# Patient Record
(demographics unavailable — no encounter records)

---

## 2025-01-15 NOTE — PAST MEDICAL HISTORY
[Menstruating] : The patient is menstruating [Menarche Age ____] : age at menarche was [unfilled] [Age At Live Birth ___] : Age at live birth: [unfilled]

## 2025-01-21 NOTE — DATA REVIEWED
[FreeTextEntry1] : 11/12/2024 Left Dx Mammo --> BIRADS 2 -There are scattered areas of fibroglandular density. postsurgical changes with removal of previously noted implantable loop recorder in the inner posterior left breast.  Recommendation: Recommend clinical correlation/management for patient reported yellow left nipple discharge. If develops suspicious clear or bloody unilateral nipple discharge, recommend surgical consultation for definitive management. In absence of suspicious clinical findings, routine annua left screening mammogram is recommended.    Patient is due for six-month follow-up right diagnostic mammograph in January 2025 following reportedly benign surgical excisional biopsy. Continue surgical consultation/management.

## 2025-01-21 NOTE — ASSESSMENT
[FreeTextEntry1] : Patient is a 44F with right breast symptomatic fibroadenoma with calcifications s/p excision on 7/17/24: hyalinized FA.  She underwent bilateral scg mmg and subsequent bilateral mmg/us in 3/2024 which showed 2 groups of calcifications biopsied as FA and benign tissue with cysts, with recommendation for mmg in 6 months.  She underwent an MRI in 4/2024 which showed left breast dermal mass (BIRADS 2).  She is now s/p excision of the symptomatic fibroadenoma on 7/17/24: hyalinized FA.  She had left mmg in 11/2024 which was BIRADS 2.  We discussed her most recent imaging in detail. We discussed it was benign.  We discussed breast density. Increasing breast density has been found to increase ones risk of breast cancer, but at this time, there is no clear indication for additional imaging in this setting, as both US and MRI have not been found to improve survival. One can consider bilateral screening US. However, out of 1000 women screened, the use of routine US will only identify an additional 3-5 cancers. The use of US was found to increase the likelihood of undergoing more imaging and more biopsies. She does not have dense breasts.   We discussed obtaining right breast imaging now. She wishes to proceed with her bilateral screening in 3/2025. she is also aware to f/u sooner if she notes any changes to her nipple discharge.  All questions and concerns were answered in detail.  Patient is for bilateral mmg in 3/2025.  She is for follow up after imaging, pending any interval changes.  Total time spent on encounter was greater than 30 minutes , which included face to face time with the patient, performing an exam, reviewing previous medical records, reviewing current imaging/ pathology, documenting in patient record and coordinating care/imaging. Greater than 50% of the encounter was spent on counseling and coordination of her breast issue.

## 2025-01-21 NOTE — HISTORY OF PRESENT ILLNESS
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 Nita Isaac.  FHx: Maternal Grandmother - breast cancer.   TC LR = 9.8%  Her work-up is as follows: 2/27/24: B/L Screening Mammo --. BIRADS 0 IMPRESSION: Right breast calcifications and asymmetries require additional evaluation. Right diagnostic mammogram is recommended with ultrasound. Questioned left breast asymmetry on mammogram 06/03/2021 may be obscured by loop recorder.  Left diagnostic mammogram is recommended with ultrasound. BIRADS-0: Incomplete - Need Additional Imaging Evaluation.   3/26/24: B/L Dx Mammo & Sono --> BIRADS 4 IMPRESSION: Two groupings of linear microcalcifications in the right breast. Recommend stereotactic core biopsy. No suspicious mammographic or sonographic findings in the left breast. Consider supplementary screening with breast MRI with contrast given family history of breast cancer. BIRADS-4: Suspicious; Biopsy Should be Considered.   4/15/2024: Stereo Guided Core Bx  Right, anterior Ca++: -Fibroadenoma associated with microcalcifications. Right, posterior Ca++: -Benign breast tissue showing stromal fibrosis, ductal hyperplasia, and cysts.  4/23/24: B/L Breast MRI --> BIRADS 2 -The breasts are composed of scattered fibroglandular tissue. RIGHT BREAST: -There are two susceptibility artifacts with associated mild smooth rim-enhancing seromas in the upper central to slightly inner breast measuring 1.6 x 1.3 cm (66512:90) and in the upper central posterior breast measuring 1.7 x 1.2 cm (58474:79-80).  These presumably corresponds to sites of recent stereotactic biopsies.  -There are inspissated retroareolar ductal secretions.  -There are scattered enhancing nonspecific foci.  -There are postreduction changes.  -There is no suspicious enhancement in the right breast. LEFT BREAST: -There is a focal skin thickening with associated 1 cm T2 hyperintense dermal enhancing mass in the lower inner breast (30272:26-27).  -Large susceptibility artifact from overlying loop recorder in the medial left breast obscures and limits evaluation of that portion of the breast.  -There are scattered enhancing nonspecific foci.  -There are postreduction changes.  -There is no suspicious enhancement in the left breast. AXILLA/OTHER:  -There is no significant axillary or internal mammary lymphadenopathy. BI-RADS 2 - Benign Finding(s) Addendum for Comparison: IMPRESSION: 1. Postbiopsy cavity with clip artifacts and small seromas right upper central to slightly inner and right upper central posterior breast without any suspicious MRI enhancement, corresponds to recent two sites right breast stereotactic biopsies. NOTE as per outside stereotactic procedure report addendum dated 4/15/2024, a 6 month follow-up right diagnostic mammogram was recommended. 2. 1 cm enhancing dermal lesion far lower inner posterior left breast. Further management on clinical grounds/dermatological consultation is suggested. 3. No suspicious enhancing breast MRI findings. RECOMMENDATION: a. Six-month follow-up right diagnostic mammogram (as per outside report dated 4/15/2024) b. Clinical follow-up of the dermal lesion in the left breast.  7/17/2024 Breast, right mass, needle localized excision: - Hyalinized fibroadenoma containing coarse stromal calcifications located adjacent to prior biopsy site changes.  11/12/2024 Left Dx Mammo --> BIRADS 2 -There are scattered areas of fibroglandular density. postsurgical changes with removal of previously noted implantable loop recorder in the inner posterior left breast.  Recommendation: Recommend clinical correlation/management for patient reported yellow left nipple discharge. If develops suspicious clear or bloody unilateral nipple discharge, recommend surgical consultation for definitive management. In absence of suspicious clinical findings, routine annua left screening mammogram is recommended.    Patient is due for six-month follow-up right diagnostic mammograph in January 2025 following reportedly benign surgical excisional biopsy. Continue surgical consultation/management.   Denies any current complaints. No acute changes to her breasts. She notes chronic left serous discharge. Minimal, non spontaneous.

## 2025-01-21 NOTE — HISTORY OF PRESENT ILLNESS
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 Nita Isaac.  FHx: Maternal Grandmother - breast cancer.   TC LR = 9.8%  Her work-up is as follows: 2/27/24: B/L Screening Mammo --. BIRADS 0 IMPRESSION: Right breast calcifications and asymmetries require additional evaluation. Right diagnostic mammogram is recommended with ultrasound. Questioned left breast asymmetry on mammogram 06/03/2021 may be obscured by loop recorder.  Left diagnostic mammogram is recommended with ultrasound. BIRADS-0: Incomplete - Need Additional Imaging Evaluation.   3/26/24: B/L Dx Mammo & Sono --> BIRADS 4 IMPRESSION: Two groupings of linear microcalcifications in the right breast. Recommend stereotactic core biopsy. No suspicious mammographic or sonographic findings in the left breast. Consider supplementary screening with breast MRI with contrast given family history of breast cancer. BIRADS-4: Suspicious; Biopsy Should be Considered.   4/15/2024: Stereo Guided Core Bx  Right, anterior Ca++: -Fibroadenoma associated with microcalcifications. Right, posterior Ca++: -Benign breast tissue showing stromal fibrosis, ductal hyperplasia, and cysts.  4/23/24: B/L Breast MRI --> BIRADS 2 -The breasts are composed of scattered fibroglandular tissue. RIGHT BREAST: -There are two susceptibility artifacts with associated mild smooth rim-enhancing seromas in the upper central to slightly inner breast measuring 1.6 x 1.3 cm (71251:90) and in the upper central posterior breast measuring 1.7 x 1.2 cm (68483:79-80).  These presumably corresponds to sites of recent stereotactic biopsies.  -There are inspissated retroareolar ductal secretions.  -There are scattered enhancing nonspecific foci.  -There are postreduction changes.  -There is no suspicious enhancement in the right breast. LEFT BREAST: -There is a focal skin thickening with associated 1 cm T2 hyperintense dermal enhancing mass in the lower inner breast (26414:26-27).  -Large susceptibility artifact from overlying loop recorder in the medial left breast obscures and limits evaluation of that portion of the breast.  -There are scattered enhancing nonspecific foci.  -There are postreduction changes.  -There is no suspicious enhancement in the left breast. AXILLA/OTHER:  -There is no significant axillary or internal mammary lymphadenopathy. BI-RADS 2 - Benign Finding(s) Addendum for Comparison: IMPRESSION: 1. Postbiopsy cavity with clip artifacts and small seromas right upper central to slightly inner and right upper central posterior breast without any suspicious MRI enhancement, corresponds to recent two sites right breast stereotactic biopsies. NOTE as per outside stereotactic procedure report addendum dated 4/15/2024, a 6 month follow-up right diagnostic mammogram was recommended. 2. 1 cm enhancing dermal lesion far lower inner posterior left breast. Further management on clinical grounds/dermatological consultation is suggested. 3. No suspicious enhancing breast MRI findings. RECOMMENDATION: a. Six-month follow-up right diagnostic mammogram (as per outside report dated 4/15/2024) b. Clinical follow-up of the dermal lesion in the left breast.  7/17/2024 Breast, right mass, needle localized excision: - Hyalinized fibroadenoma containing coarse stromal calcifications located adjacent to prior biopsy site changes.  11/12/2024 Left Dx Mammo --> BIRADS 2 -There are scattered areas of fibroglandular density. postsurgical changes with removal of previously noted implantable loop recorder in the inner posterior left breast.  Recommendation: Recommend clinical correlation/management for patient reported yellow left nipple discharge. If develops suspicious clear or bloody unilateral nipple discharge, recommend surgical consultation for definitive management. In absence of suspicious clinical findings, routine annua left screening mammogram is recommended.    Patient is due for six-month follow-up right diagnostic mammograph in January 2025 following reportedly benign surgical excisional biopsy. Continue surgical consultation/management.   Denies any current complaints. No acute changes to her breasts. She notes chronic left serous discharge. Minimal, non spontaneous.

## 2025-01-21 NOTE — PHYSICAL EXAM
[Normocephalic] : normocephalic [EOMI] : extra ocular movement intact [No Supraclavicular Adenopathy] : no supraclavicular adenopathy [No Cervical Adenopathy] : no cervical adenopathy [Examined in the supine and seated position] : examined in the supine and seated position [No dominant masses] : no dominant masses in right breast  [No dominant masses] : no dominant masses left breast [No Nipple Retraction] : no left nipple retraction [No Nipple Discharge] : no left nipple discharge [No Axillary Lymphadenopathy] : no left axillary lymphadenopathy [No Rashes] : no rashes [No Ulceration] : no ulceration [de-identified] : NL respirations

## 2025-01-21 NOTE — PHYSICAL EXAM
[Normocephalic] : normocephalic [EOMI] : extra ocular movement intact [No Supraclavicular Adenopathy] : no supraclavicular adenopathy [No Cervical Adenopathy] : no cervical adenopathy [Examined in the supine and seated position] : examined in the supine and seated position [No dominant masses] : no dominant masses in right breast  [No dominant masses] : no dominant masses left breast [No Nipple Retraction] : no left nipple retraction [No Nipple Discharge] : no left nipple discharge [No Axillary Lymphadenopathy] : no left axillary lymphadenopathy [No Rashes] : no rashes [No Ulceration] : no ulceration [de-identified] : NL respirations

## 2025-01-21 NOTE — DATA REVIEWED
[FreeTextEntry1] : 11/12/2024 Left Dx Mammo --> BIRADS 2 -There are scattered areas of fibroglandular density. postsurgical changes with removal of previously noted implantable loop recorder in the inner posterior left breast.  Recommendation: Recommend clinical correlation/management for patient reported yellow left nipple discharge. If develops suspicious clear or bloody unilateral nipple discharge, recommend surgical consultation for definitive management. In absence of suspicious clinical findings, routine annua left screening mammogram is recommended.    Patient is due for six-month follow-up right diagnostic mammograph in January 2025 following reportedly benign surgical excisional biopsy. Continue surgical consultation/management.  [employer] Intermediate Repair Preamble Text (Leave Blank If You Do Not Want): Undermining was performed with blunt dissection.

## 2025-01-29 NOTE — REVIEW OF SYSTEMS
[Depression] : depression [Decreased Libido] : decreased libido [Negative] : Heme/Lymph [FreeTextEntry8] : see HPI [de-identified] : see HPI

## 2025-01-29 NOTE — HISTORY OF PRESENT ILLNESS
[Patient reported mammogram was normal] : Patient reported mammogram was normal [Patient reported breast sonogram was normal] : Patient reported breast sonogram was normal [Patient reported PAP Smear was normal] : Patient reported PAP Smear was normal [N] : Patient does not use contraception [Y] : Positive pregnancy history [Mammogramdate] : 2024 [BreastSonogramDate] : 3/2024 [PapSmeardate] : 2020 [MensesFreq] : 28 [MensesLength] : 3-6 [MensesAmount] : heavy flow  [PGHxTotal] : 3 [[facility] : 2 [PGHxAbortions] : 1 [PGHxABSpont] : 1 [[facility]] : 2 [Normal Amount/Duration] :  normal amount and duration [Regular Cycle Intervals] : periods have been regular [Frequency: Q ___ days] : menstrual periods occur approximately every [unfilled] days [Menarche Age: ____] : age at menarche was [unfilled] [Currently Active] : currently active [Men] : men [No] : No [TextBox_4] : 44yo  with LMP 1/15/25 currently menstruating and declining exam and evaluation but wants to discuss her irregular heavy bleeding and perimenopause sx.  She c/o decreased libido, anxiety and depressed mood.  She is concerned about menopause and mgmt options.  Her depression evaluation was positive but patient denies SI/HI and states she does not want to take any medication,  [FreeTextEntry1] : 1/15/25

## 2025-01-29 NOTE — REVIEW OF SYSTEMS
[Depression] : depression [Decreased Libido] : decreased libido [Negative] : Heme/Lymph [FreeTextEntry8] : see HPI [de-identified] : see HPI

## 2025-01-29 NOTE — COUNSELING
[Nutrition/ Exercise/ Weight Management] : nutrition, exercise, weight management [Vitamins/Supplements] : vitamins/supplements [Bladder Hygiene] : bladder hygiene [Contraception/ Emergency Contraception/ Safe Sexual Practices] : contraception, emergency contraception, safe sexual practices [Lab Results] : lab results [Medication Management] : medication management

## 2025-01-29 NOTE — REVIEW OF SYSTEMS
[Depression] : depression [Decreased Libido] : decreased libido [Negative] : Heme/Lymph [FreeTextEntry8] : see HPI [de-identified] : see HPI

## 2025-01-29 NOTE — HISTORY OF PRESENT ILLNESS
[Patient reported mammogram was normal] : Patient reported mammogram was normal [Patient reported breast sonogram was normal] : Patient reported breast sonogram was normal [Patient reported PAP Smear was normal] : Patient reported PAP Smear was normal [N] : Patient does not use contraception [Y] : Positive pregnancy history [Mammogramdate] : 2024 [BreastSonogramDate] : 3/2024 [PapSmeardate] : 2020 [MensesFreq] : 28 [MensesLength] : 3-6 [MensesAmount] : heavy flow  [PGHxTotal] : 3 [[facility]] : 2 [PGHxAbortions] : 1 [[facility]] : 2 [PGHxABSpont] : 1 [Normal Amount/Duration] :  normal amount and duration [Regular Cycle Intervals] : periods have been regular [Frequency: Q ___ days] : menstrual periods occur approximately every [unfilled] days [Menarche Age: ____] : age at menarche was [unfilled] [Currently Active] : currently active [Men] : men [No] : No [TextBox_4] : 44yo  with LMP 1/15/25 currently menstruating and declining exam and evaluation but wants to discuss her irregular heavy bleeding and perimenopause sx.  She c/o decreased libido, anxiety and depressed mood.  She is concerned about menopause and mgmt options.  Her depression evaluation was positive but patient denies SI/HI and states she does not want to take any medication,  [FreeTextEntry1] : 1/15/25

## 2025-01-29 NOTE — PHYSICAL EXAM
[No Lymphadenopathy] : no lymphadenopathy [Soft] : soft [Non-tender] : non-tender [Non-distended] : non-distended [No Mass] : no mass [Examination Of The Breasts] : a normal appearance [No Discharge] : no discharge [Breast Reconstruction Right] : breast reconstruction [Breast Reconstruction Left] : breast reconstruction [No Masses] : no breast masses were palpable [No Lesions] : no lesions  [Labia Majora] : normal [Labia Minora] : normal [Normal] : normal [No Bleeding] : There was no active vaginal bleeding [Tenderness] : nontender [Enlarged ___ wks] : not enlarged [Mass ___ cm] : no uterine mass was palpated [Uterine Adnexae] : normal [FreeTextEntry5] :  Pap smear done. [FreeTextEntry6] : Exam limited to habitus-no pain elicited on bimanual exam, no rebound or guarding   [Chaperone Declined] : Patient declined chaperone [Appropriately responsive] : appropriately responsive [Alert] : alert [No Acute Distress] : no acute distress [Regular Rate Rhythm] : regular rate rhythm [Oriented x3] : oriented x3 [Depressed Mood] : depressed mood

## 2025-01-29 NOTE — HISTORY OF PRESENT ILLNESS
[Patient reported mammogram was normal] : Patient reported mammogram was normal [Patient reported breast sonogram was normal] : Patient reported breast sonogram was normal [Patient reported PAP Smear was normal] : Patient reported PAP Smear was normal [N] : Patient does not use contraception [Y] : Positive pregnancy history [Mammogramdate] : 2024 [BreastSonogramDate] : 3/2024 [PapSmeardate] : 2020 [MensesFreq] : 28 [MensesLength] : 3-6 [MensesAmount] : heavy flow  [PGHxTotal] : 3 [[facility]] : 2 [PGHxAbortions] : 1 [[facility]xLiving] : 2 [PGHxABSpont] : 1 [Normal Amount/Duration] :  normal amount and duration [Regular Cycle Intervals] : periods have been regular [Frequency: Q ___ days] : menstrual periods occur approximately every [unfilled] days [Menarche Age: ____] : age at menarche was [unfilled] [Currently Active] : currently active [Men] : men [No] : No [TextBox_4] : 46yo  with LMP 1/15/25 currently menstruating and declining exam and evaluation but wants to discuss her irregular heavy bleeding and perimenopause sx.  She c/o decreased libido, anxiety and depressed mood.  She is concerned about menopause and mgmt options.  Her depression evaluation was positive but patient denies SI/HI and states she does not want to take any medication,  [FreeTextEntry1] : 1/15/25

## 2025-01-29 NOTE — DISCUSSION/SUMMARY
[FreeTextEntry1] : A: 45-year-old with irregular/heavy menstrual bleeding, adenomyosis, perimenopause symptoms, depressed mood, decreased libido, BMI 40  P: Consultation of patient's symptoms and management counseling done Patient declined physical exam Safe sex practices Pain and bleeding precautions-menstrual diary encouraged Referral for pelvic ultrasound Horrible panel diet Perimenopause symptom management counseling done-patient will think about hormonal and nonhormonal options and follow-up for further management planning after workup completed Patient encouraged to follow-up with therapy or psychiatry for potential medical management which she declined at this time-strict precautions given Encourage healthy diet and exercise and weight loss as tolerated Follow-up after tests and imaging completed or for routine exam or as needed

## 2025-04-22 NOTE — HISTORY OF PRESENT ILLNESS
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 Nita Isaac.  FHx: Maternal Grandmother - breast cancer.   TC LR = 9.8%  Her work-up is as follows: 2/27/24: B/L Screening Mammo --. BIRADS 0 IMPRESSION: Right breast calcifications and asymmetries require additional evaluation. Right diagnostic mammogram is recommended with ultrasound. Questioned left breast asymmetry on mammogram 06/03/2021 may be obscured by loop recorder.  Left diagnostic mammogram is recommended with ultrasound. BIRADS-0: Incomplete - Need Additional Imaging Evaluation.   3/26/24: B/L Dx Mammo & Sono --> BIRADS 4 IMPRESSION: Two groupings of linear microcalcifications in the right breast. Recommend stereotactic core biopsy. No suspicious mammographic or sonographic findings in the left breast. Consider supplementary screening with breast MRI with contrast given family history of breast cancer. BIRADS-4: Suspicious; Biopsy Should be Considered.   4/15/2024: Stereo Guided Core Bx  Right, anterior Ca++: -Fibroadenoma associated with microcalcifications. Right, posterior Ca++: -Benign breast tissue showing stromal fibrosis, ductal hyperplasia, and cysts.  4/23/24: B/L Breast MRI --> BIRADS 2 -The breasts are composed of scattered fibroglandular tissue. RIGHT BREAST: -There are two susceptibility artifacts with associated mild smooth rim-enhancing seromas in the upper central to slightly inner breast measuring 1.6 x 1.3 cm (03109:90) and in the upper central posterior breast measuring 1.7 x 1.2 cm (10939:79-80).  These presumably corresponds to sites of recent stereotactic biopsies.  -There are inspissated retroareolar ductal secretions.  -There are scattered enhancing nonspecific foci.  -There are postreduction changes.  -There is no suspicious enhancement in the right breast. LEFT BREAST: -There is a focal skin thickening with associated 1 cm T2 hyperintense dermal enhancing mass in the lower inner breast (78562:26-27).  -Large susceptibility artifact from overlying loop recorder in the medial left breast obscures and limits evaluation of that portion of the breast.  -There are scattered enhancing nonspecific foci.  -There are postreduction changes.  -There is no suspicious enhancement in the left breast. AXILLA/OTHER:  -There is no significant axillary or internal mammary lymphadenopathy. BI-RADS 2 - Benign Finding(s) Addendum for Comparison: IMPRESSION: 1. Postbiopsy cavity with clip artifacts and small seromas right upper central to slightly inner and right upper central posterior breast without any suspicious MRI enhancement, corresponds to recent two sites right breast stereotactic biopsies. NOTE as per outside stereotactic procedure report addendum dated 4/15/2024, a 6 month follow-up right diagnostic mammogram was recommended. 2. 1 cm enhancing dermal lesion far lower inner posterior left breast. Further management on clinical grounds/dermatological consultation is suggested. 3. No suspicious enhancing breast MRI findings. RECOMMENDATION: a. Six-month follow-up right diagnostic mammogram (as per outside report dated 4/15/2024) b. Clinical follow-up of the dermal lesion in the left breast.  7/17/2024 Breast, right mass, needle localized excision: - Hyalinized fibroadenoma containing coarse stromal calcifications located adjacent to prior biopsy site changes.  11/12/2024 Left Dx Mammo --> BIRADS 2 -There are scattered areas of fibroglandular density. postsurgical changes with removal of previously noted implantable loop recorder in the inner posterior left breast.  Recommendation: Recommend clinical correlation/management for patient reported yellow left nipple discharge. If develops suspicious clear or bloody unilateral nipple discharge, recommend surgical consultation for definitive management. In absence of suspicious clinical findings, routine annua left screening mammogram is recommended.    Patient is due for six-month follow-up right diagnostic mammograph in January 2025 following reportedly benign surgical excisional biopsy. Continue surgical consultation/management.   She notes chronic left serous discharge. Minimal, non spontaneous.   3/26/2025 b/l mammo-->birads0 scattered areas of fibroglandular density.  an area of benign architectural distortion corresponding to the site of surgery seen in the right breast.  a nipple retraction seen in the left breast.  4/15/2025 left dx mammo and US -->birads1 scattered areas of fibroglandular density. No mammographic or sonographic evidence of malignancy.  No acute changes to her breasts.

## 2025-04-22 NOTE — DATA REVIEWED
[FreeTextEntry1] : [id number]     EXAM:   MAMMO SCREEN W EDIE BI#   ORDERED BY: THERESE GRANGER  PROCEDURE DATE:  03/26/2025    INTERPRETATION:  HISTORY: Bilateral MG MAMMO SCREEN W EDIE BI# was performed. Patient is 45 years old and is seen for screening. The patient has no personal history of cancer.  The patient has the following family history of breast cancer:  mother, at age 57, breast cancer.  RISK ASSESSMENT: NCI Lifetime Risk: 14.4 [facility] Lifetime Risk: 12.1  CLINICAL BREAST EXAM: The patient reports their last clinical breast exam was performed within the past year.  COMPARISON STUDIES: No prior imaging studies are available for comparison.  MAMMOGRAM FINDINGS: Mammography was performed including the following views: bilateral craniocaudal with tomosynthesis, bilateral mediolateral oblique with tomosynthesis.  The examination includes digital synthetic 2D and digital tomosynthesis 3D images. Additional imaging analysis was performed using CAD (computer-aided detection) software.  There are scattered areas of fibroglandular density.  Finding 1:  There is an area of benign architectural distortion corresponding to the site of surgery seen in the right breast.  Finding 2:  There is a nipple retraction seen in the left breast.  IMPRESSION: Finding 1:  Area of benign architectural distortion corresponding to the site of surgery and consistent with post operative fibrosis in the right breast is benign finding.  Finding 2:  Nipple retraction in the left breast requires additional evaluation.  RECOMMENDATION: Patient will be recalled for additional mammographic views and breast ultrasound.  ASSESSMENT: BI-RADS Category 0:  Incomplete: Needs Additional Imaging Evaluation 7518     EXAM:   US BREAST LIMITED LT   ORDERED BY: THERESE GRANGER  ACC: [id number]     EXAM:   MAMMO DIAG W EDIE LT#   ORDERED BY: THERESE GRANGER  PROCEDURE DATE:  04/15/2025    INTERPRETATION:  Clinical history: Call back for suggestion of left nipple retraction.  The patient reports last clinical breast examination was performed about: Within the past year.  Family history of breast cancer: Mother at age 57.  Comparisons: Mammograms dating back to 2021.  Views obtained: left full field digital 2D and digital tomosynthesis images as well as spot views.  Computer-aided detection was utilized in the interpretation of this examination.  Breast composition: There are scattered areas of fibroglandular density.  Findings:  Mammogram:  No suspicious mass, microcalcifications or areas of architectural distortion seen in the left breast including the retroareolar region.  Ultrasound:  Targeted unilateral left breast ultrasound was performed.  No suspicious finding in the retroareolar region of the left breast.  Impression: No mammographic or sonographic evidence of malignancy.  Recommendation: Unless otherwise indicated by clinical findings, annual screening mammography recommended.  BI-RADS Category 1: Negative

## 2025-04-22 NOTE — HISTORY OF PRESENT ILLNESS
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 Nita Isaac.  FHx: Maternal Grandmother - breast cancer.   TC LR = 9.8%  Her work-up is as follows: 2/27/24: B/L Screening Mammo --. BIRADS 0 IMPRESSION: Right breast calcifications and asymmetries require additional evaluation. Right diagnostic mammogram is recommended with ultrasound. Questioned left breast asymmetry on mammogram 06/03/2021 may be obscured by loop recorder.  Left diagnostic mammogram is recommended with ultrasound. BIRADS-0: Incomplete - Need Additional Imaging Evaluation.   3/26/24: B/L Dx Mammo & Sono --> BIRADS 4 IMPRESSION: Two groupings of linear microcalcifications in the right breast. Recommend stereotactic core biopsy. No suspicious mammographic or sonographic findings in the left breast. Consider supplementary screening with breast MRI with contrast given family history of breast cancer. BIRADS-4: Suspicious; Biopsy Should be Considered.   4/15/2024: Stereo Guided Core Bx  Right, anterior Ca++: -Fibroadenoma associated with microcalcifications. Right, posterior Ca++: -Benign breast tissue showing stromal fibrosis, ductal hyperplasia, and cysts.  4/23/24: B/L Breast MRI --> BIRADS 2 -The breasts are composed of scattered fibroglandular tissue. RIGHT BREAST: -There are two susceptibility artifacts with associated mild smooth rim-enhancing seromas in the upper central to slightly inner breast measuring 1.6 x 1.3 cm (45227:90) and in the upper central posterior breast measuring 1.7 x 1.2 cm (55727:79-80).  These presumably corresponds to sites of recent stereotactic biopsies.  -There are inspissated retroareolar ductal secretions.  -There are scattered enhancing nonspecific foci.  -There are postreduction changes.  -There is no suspicious enhancement in the right breast. LEFT BREAST: -There is a focal skin thickening with associated 1 cm T2 hyperintense dermal enhancing mass in the lower inner breast (03736:26-27).  -Large susceptibility artifact from overlying loop recorder in the medial left breast obscures and limits evaluation of that portion of the breast.  -There are scattered enhancing nonspecific foci.  -There are postreduction changes.  -There is no suspicious enhancement in the left breast. AXILLA/OTHER:  -There is no significant axillary or internal mammary lymphadenopathy. BI-RADS 2 - Benign Finding(s) Addendum for Comparison: IMPRESSION: 1. Postbiopsy cavity with clip artifacts and small seromas right upper central to slightly inner and right upper central posterior breast without any suspicious MRI enhancement, corresponds to recent two sites right breast stereotactic biopsies. NOTE as per outside stereotactic procedure report addendum dated 4/15/2024, a 6 month follow-up right diagnostic mammogram was recommended. 2. 1 cm enhancing dermal lesion far lower inner posterior left breast. Further management on clinical grounds/dermatological consultation is suggested. 3. No suspicious enhancing breast MRI findings. RECOMMENDATION: a. Six-month follow-up right diagnostic mammogram (as per outside report dated 4/15/2024) b. Clinical follow-up of the dermal lesion in the left breast.  7/17/2024 Breast, right mass, needle localized excision: - Hyalinized fibroadenoma containing coarse stromal calcifications located adjacent to prior biopsy site changes.  11/12/2024 Left Dx Mammo --> BIRADS 2 -There are scattered areas of fibroglandular density. postsurgical changes with removal of previously noted implantable loop recorder in the inner posterior left breast.  Recommendation: Recommend clinical correlation/management for patient reported yellow left nipple discharge. If develops suspicious clear or bloody unilateral nipple discharge, recommend surgical consultation for definitive management. In absence of suspicious clinical findings, routine annua left screening mammogram is recommended.    Patient is due for six-month follow-up right diagnostic mammograph in January 2025 following reportedly benign surgical excisional biopsy. Continue surgical consultation/management.   She notes chronic left serous discharge. Minimal, non spontaneous.   3/26/2025 b/l mammo-->birads0 scattered areas of fibroglandular density.  an area of benign architectural distortion corresponding to the site of surgery seen in the right breast.  a nipple retraction seen in the left breast.  4/15/2025 left dx mammo and US -->birads1 scattered areas of fibroglandular density. No mammographic or sonographic evidence of malignancy.  No acute changes to her breasts.

## 2025-04-22 NOTE — PHYSICAL EXAM
[Normocephalic] : normocephalic [EOMI] : extra ocular movement intact [No Supraclavicular Adenopathy] : no supraclavicular adenopathy [No Cervical Adenopathy] : no cervical adenopathy [Examined in the supine and seated position] : examined in the supine and seated position [No dominant masses] : no dominant masses in right breast  [No dominant masses] : no dominant masses left breast [No Nipple Retraction] : no left nipple retraction [No Nipple Discharge] : no left nipple discharge [No Axillary Lymphadenopathy] : no left axillary lymphadenopathy [No Rashes] : no rashes [No Ulceration] : no ulceration [de-identified] : NL respirations

## 2025-04-22 NOTE — PHYSICAL EXAM
[Normocephalic] : normocephalic [EOMI] : extra ocular movement intact [No Supraclavicular Adenopathy] : no supraclavicular adenopathy [No Cervical Adenopathy] : no cervical adenopathy [Examined in the supine and seated position] : examined in the supine and seated position [No dominant masses] : no dominant masses in right breast  [No dominant masses] : no dominant masses left breast [No Nipple Retraction] : no left nipple retraction [No Nipple Discharge] : no left nipple discharge [No Axillary Lymphadenopathy] : no left axillary lymphadenopathy [No Rashes] : no rashes [No Ulceration] : no ulceration [de-identified] : NL respirations

## 2025-04-22 NOTE — ASSESSMENT
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   TC LR = 9.8%  Her work-up is as follows: 2/27/24: B/L Screening Mammo --. BIRADS 0 IMPRESSION: Right breast calcifications and asymmetries require additional evaluation. Right diagnostic mammogram is recommended with ultrasound. Questioned left breast asymmetry on mammogram 06/03/2021 may be obscured by loop recorder.  Left diagnostic mammogram is recommended with ultrasound. BIRADS-0: Incomplete - Need Additional Imaging Evaluation.   3/26/24: B/L Dx Mammo & Sono --> BIRADS 4 IMPRESSION: Two groupings of linear microcalcifications in the right breast. Recommend stereotactic core biopsy. No suspicious mammographic or sonographic findings in the left breast. Consider supplementary screening with breast MRI with contrast given family history of breast cancer. BIRADS-4: Suspicious; Biopsy Should be Considered.   4/15/2024: Stereo Guided Core Bx  Right, anterior Ca++: -Fibroadenoma associated with microcalcifications. Right, posterior Ca++: -Benign breast tissue showing stromal fibrosis, ductal hyperplasia, and cysts.  4/23/24: B/L Breast MRI --> BIRADS 2 -The breasts are composed of scattered fibroglandular tissue. RIGHT BREAST: -There are two susceptibility artifacts with associated mild smooth rim-enhancing seromas in the upper central to slightly inner breast measuring 1.6 x 1.3 cm (50568:90) and in the upper central posterior breast measuring 1.7 x 1.2 cm (43520:79-80).  These presumably corresponds to sites of recent stereotactic biopsies.  -There are inspissated retroareolar ductal secretions.  -There are scattered enhancing nonspecific foci.  -There are postreduction changes.  -There is no suspicious enhancement in the right breast. LEFT BREAST: -There is a focal skin thickening with associated 1 cm T2 hyperintense dermal enhancing mass in the lower inner breast (92364:26-27).  -Large susceptibility artifact from overlying loop recorder in the medial left breast obscures and limits evaluation of that portion of the breast.  -There are scattered enhancing nonspecific foci.  -There are postreduction changes.  -There is no suspicious enhancement in the left breast. AXILLA/OTHER:  -There is no significant axillary or internal mammary lymphadenopathy. BI-RADS 2 - Benign Finding(s) Addendum for Comparison: IMPRESSION: 1. Postbiopsy cavity with clip artifacts and small seromas right upper central to slightly inner and right upper central posterior breast without any suspicious MRI enhancement, corresponds to recent two sites right breast stereotactic biopsies. NOTE as per outside stereotactic procedure report addendum dated 4/15/2024, a 6 month follow-up right diagnostic mammogram was recommended. 2. 1 cm enhancing dermal lesion far lower inner posterior left breast. Further management on clinical grounds/dermatological consultation is suggested. 3. No suspicious enhancing breast MRI findings. RECOMMENDATION: a. Six-month follow-up right diagnostic mammogram (as per outside report dated 4/15/2024) b. Clinical follow-up of the dermal lesion in the left breast.  7/17/2024 Breast, right mass, needle localized excision: - Hyalinized fibroadenoma containing coarse stromal calcifications located adjacent to prior biopsy site changes.  11/12/2024 Left Dx Mammo --> BIRADS 2 -There are scattered areas of fibroglandular density. postsurgical changes with removal of previously noted implantable loop recorder in the inner posterior left breast.  Recommendation: Recommend clinical correlation/management for patient reported yellow left nipple discharge. If develops suspicious clear or bloody unilateral nipple discharge, recommend surgical consultation for definitive management. In absence of suspicious clinical findings, routine annua left screening mammogram is recommended.    Patient is due for six-month follow-up right diagnostic mammograph in January 2025 following reportedly benign surgical excisional biopsy. Continue surgical consultation/management.   She notes chronic left serous discharge. Minimal, non spontaneous.   3/26/2025 b/l mammo-->birads0 scattered areas of fibroglandular density.  an area of benign architectural distortion corresponding to the site of surgery seen in the right breast.  a nipple retraction seen in the left breast.  4/15/2025 left dx mammo and US -->birads1 scattered areas of fibroglandular density. No mammographic or sonographic evidence of malignancy.  We discussed her most recent imaging in detail. We discussed it was benign.  We discussed breast density. Increasing breast density has been found to increase ones risk of breast cancer, but at this time, there is no clear indication for additional imaging in this setting, as both US and MRI have not been found to improve survival. One can consider bilateral screening US. However, out of 1000 women screened, the use of routine US will only identify an additional 3-5 cancers. The use of US was found to increase the likelihood of undergoing more imaging and more biopsies. She does not have dense breasts.   All questions and concerns were answered in detail.  Patient is for bilateral mmg in 3/2026. She is for follow up after imaging, pending any interval changes.  Total time spent on encounter was greater than 30 minutes , which included face to face time with the patient, performing an exam, reviewing previous medical records, reviewing current imaging/ pathology, documenting in patient record and coordinating care/imaging. Greater than 50% of the encounter was spent on counseling and coordination of her breast issue.

## 2025-04-22 NOTE — DATA REVIEWED
[FreeTextEntry1] : [id number]     EXAM:   MAMMO SCREEN W EDIE BI#   ORDERED BY: THERESE GRANGER  PROCEDURE DATE:  03/26/2025    INTERPRETATION:  HISTORY: Bilateral MG MAMMO SCREEN W EDIE BI# was performed. Patient is 45 years old and is seen for screening. The patient has no personal history of cancer.  The patient has the following family history of breast cancer:  mother, at age 57, breast cancer.  RISK ASSESSMENT: NCI Lifetime Risk: 14.4 [facility] Lifetime Risk: 12.1  CLINICAL BREAST EXAM: The patient reports their last clinical breast exam was performed within the past year.  COMPARISON STUDIES: No prior imaging studies are available for comparison.  MAMMOGRAM FINDINGS: Mammography was performed including the following views: bilateral craniocaudal with tomosynthesis, bilateral mediolateral oblique with tomosynthesis.  The examination includes digital synthetic 2D and digital tomosynthesis 3D images. Additional imaging analysis was performed using CAD (computer-aided detection) software.  There are scattered areas of fibroglandular density.  Finding 1:  There is an area of benign architectural distortion corresponding to the site of surgery seen in the right breast.  Finding 2:  There is a nipple retraction seen in the left breast.  IMPRESSION: Finding 1:  Area of benign architectural distortion corresponding to the site of surgery and consistent with post operative fibrosis in the right breast is benign finding.  Finding 2:  Nipple retraction in the left breast requires additional evaluation.  RECOMMENDATION: Patient will be recalled for additional mammographic views and breast ultrasound.  ASSESSMENT: BI-RADS Category 0:  Incomplete: Needs Additional Imaging Evaluation 7518     EXAM:   US BREAST LIMITED LT   ORDERED BY: THERESE GRANGER  ACC: [id number]     EXAM:   MAMMO DIAG W EDIE LT#   ORDERED BY: THERESE GRANGER  PROCEDURE DATE:  04/15/2025    INTERPRETATION:  Clinical history: Call back for suggestion of left nipple retraction.  The patient reports last clinical breast examination was performed about: Within the past year.  Family history of breast cancer: Mother at age 57.  Comparisons: Mammograms dating back to 2021.  Views obtained: left full field digital 2D and digital tomosynthesis images as well as spot views.  Computer-aided detection was utilized in the interpretation of this examination.  Breast composition: There are scattered areas of fibroglandular density.  Findings:  Mammogram:  No suspicious mass, microcalcifications or areas of architectural distortion seen in the left breast including the retroareolar region.  Ultrasound:  Targeted unilateral left breast ultrasound was performed.  No suspicious finding in the retroareolar region of the left breast.  Impression: No mammographic or sonographic evidence of malignancy.  Recommendation: Unless otherwise indicated by clinical findings, annual screening mammography recommended.  BI-RADS Category 1: Negative

## 2025-04-22 NOTE — ASSESSMENT
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   TC LR = 9.8%  Her work-up is as follows: 2/27/24: B/L Screening Mammo --. BIRADS 0 IMPRESSION: Right breast calcifications and asymmetries require additional evaluation. Right diagnostic mammogram is recommended with ultrasound. Questioned left breast asymmetry on mammogram 06/03/2021 may be obscured by loop recorder.  Left diagnostic mammogram is recommended with ultrasound. BIRADS-0: Incomplete - Need Additional Imaging Evaluation.   3/26/24: B/L Dx Mammo & Sono --> BIRADS 4 IMPRESSION: Two groupings of linear microcalcifications in the right breast. Recommend stereotactic core biopsy. No suspicious mammographic or sonographic findings in the left breast. Consider supplementary screening with breast MRI with contrast given family history of breast cancer. BIRADS-4: Suspicious; Biopsy Should be Considered.   4/15/2024: Stereo Guided Core Bx  Right, anterior Ca++: -Fibroadenoma associated with microcalcifications. Right, posterior Ca++: -Benign breast tissue showing stromal fibrosis, ductal hyperplasia, and cysts.  4/23/24: B/L Breast MRI --> BIRADS 2 -The breasts are composed of scattered fibroglandular tissue. RIGHT BREAST: -There are two susceptibility artifacts with associated mild smooth rim-enhancing seromas in the upper central to slightly inner breast measuring 1.6 x 1.3 cm (52976:90) and in the upper central posterior breast measuring 1.7 x 1.2 cm ([id number]:79-80).  These presumably corresponds to sites of recent stereotactic biopsies.  -There are inspissated retroareolar ductal secretions.  -There are scattered enhancing nonspecific foci.  -There are postreduction changes.  -There is no suspicious enhancement in the right breast. LEFT BREAST: -There is a focal skin thickening with associated 1 cm T2 hyperintense dermal enhancing mass in the lower inner breast (75714:26-27).  -Large susceptibility artifact from overlying loop recorder in the medial left breast obscures and limits evaluation of that portion of the breast.  -There are scattered enhancing nonspecific foci.  -There are postreduction changes.  -There is no suspicious enhancement in the left breast. AXILLA/OTHER:  -There is no significant axillary or internal mammary lymphadenopathy. BI-RADS 2 - Benign Finding(s) Addendum for Comparison: IMPRESSION: 1. Postbiopsy cavity with clip artifacts and small seromas right upper central to slightly inner and right upper central posterior breast without any suspicious MRI enhancement, corresponds to recent two sites right breast stereotactic biopsies. NOTE as per outside stereotactic procedure report addendum dated 4/15/2024, a 6 month follow-up right diagnostic mammogram was recommended. 2. 1 cm enhancing dermal lesion far lower inner posterior left breast. Further management on clinical grounds/dermatological consultation is suggested. 3. No suspicious enhancing breast MRI findings. RECOMMENDATION: a. Six-month follow-up right diagnostic mammogram (as per outside report dated 4/15/2024) b. Clinical follow-up of the dermal lesion in the left breast.  7/17/2024 Breast, right mass, needle localized excision: - Hyalinized fibroadenoma containing coarse stromal calcifications located adjacent to prior biopsy site changes.  11/12/2024 Left Dx Mammo --> BIRADS 2 -There are scattered areas of fibroglandular density. postsurgical changes with removal of previously noted implantable loop recorder in the inner posterior left breast.  Recommendation: Recommend clinical correlation/management for patient reported yellow left nipple discharge. If develops suspicious clear or bloody unilateral nipple discharge, recommend surgical consultation for definitive management. In absence of suspicious clinical findings, routine annua left screening mammogram is recommended.    Patient is due for six-month follow-up right diagnostic mammograph in January 2025 following reportedly benign surgical excisional biopsy. Continue surgical consultation/management.   She notes chronic left serous discharge. Minimal, non spontaneous.   3/26/2025 b/l mammo-->birads0 scattered areas of fibroglandular density.  an area of benign architectural distortion corresponding to the site of surgery seen in the right breast.  a nipple retraction seen in the left breast.  4/15/2025 left dx mammo and US -->birads1 scattered areas of fibroglandular density. No mammographic or sonographic evidence of malignancy.  We discussed her most recent imaging in detail. We discussed it was benign.  We discussed breast density. Increasing breast density has been found to increase ones risk of breast cancer, but at this time, there is no clear indication for additional imaging in this setting, as both US and MRI have not been found to improve survival. One can consider bilateral screening US. However, out of 1000 women screened, the use of routine US will only identify an additional 3-5 cancers. The use of US was found to increase the likelihood of undergoing more imaging and more biopsies. She does not have dense breasts.   All questions and concerns were answered in detail.  Patient is for bilateral mmg in 3/2026. She is for follow up after imaging, pending any interval changes.  Total time spent on encounter was greater than 30 minutes , which included face to face time with the patient, performing an exam, reviewing previous medical records, reviewing current imaging/ pathology, documenting in patient record and coordinating care/imaging. Greater than 50% of the encounter was spent on counseling and coordination of her breast issue.

## 2025-04-27 NOTE — DISCUSSION/SUMMARY
[FreeTextEntry1] : A: 45-year-old with irregular menses/dysmenorrhea, AUB, adenomyosis, perimenopause, BMI 40  P: GYN consultation and recent test results reviewed and discussed Safe sex practices Pain and bleeding precautions Menstrual diary encouraged Options of symptom management counseled-patient will think about her options Referral for cardiologist for HRT/medication management given Encourage follow-up with therapy as scheduled and encourage psychiatry evaluation-patient will think about options Encourage healthy diet and activity as tolerated Follow-up for further management planning and for annual GYN exam or as needed

## 2025-04-27 NOTE — HISTORY OF PRESENT ILLNESS
[TextBox_4] : 46y/o  LMP- 4- cam for follow up consultation for evaluation and mgmt of perimenopause symptoms.  Patient states menses continues to be irregular skipping February, very heavy in March and this month 1 day of bleeding and then continued spotting.  She declines exam today due to bleeding.  She is due for pap and annual exam but states she will return.  She has history of adenomyosis noted on previous MRI but most recent pelvic ultrasound was within normal limits and did not mention any evidence of adenomyosis although sonogram is not as reliable.  She also continues to struggle with depressed mood and stress but has started couple therapy and is coping better.  She denies SI/HI but still was counseled on recommendation to follow-up with psychiatry and potential medication management.  We discussed the risk/benefits/alternatives of all options of management due to patient's complaints and symptoms.  We discussed conservative management with pain and bleeding precautions.  We discussed medical management however patient does have a history of A-fib and is on medication and counseled if considering HRT would need cardiac clearance and letter will be provided.  We discussed progesterone only as long as not contraindicated with her current medications to try to limit blood loss but may not help with other perimenopausal symptoms.  We also discussed more definitive management for her abnormal bleeding and menstrual symptoms and pain including hysterectomy.  Patient understood all counseling all questions answered satisfactorily.  She will follow-up with cardiology and think about her options and will come back when she has decided how she would like to proceed with her symptom management.  [FreeTextEntry1] : 4/18/25

## 2025-04-27 NOTE — HISTORY OF PRESENT ILLNESS
[TextBox_4] : 44y/o  LMP- 4- cam for follow up consultation for evaluation and mgmt of perimenopause symptoms.  Patient states menses continues to be irregular skipping February, very heavy in March and this month 1 day of bleeding and then continued spotting.  She declines exam today due to bleeding.  She is due for pap and annual exam but states she will return.  She has history of adenomyosis noted on previous MRI but most recent pelvic ultrasound was within normal limits and did not mention any evidence of adenomyosis although sonogram is not as reliable.  She also continues to struggle with depressed mood and stress but has started couple therapy and is coping better.  She denies SI/HI but still was counseled on recommendation to follow-up with psychiatry and potential medication management.  We discussed the risk/benefits/alternatives of all options of management due to patient's complaints and symptoms.  We discussed conservative management with pain and bleeding precautions.  We discussed medical management however patient does have a history of A-fib and is on medication and counseled if considering HRT would need cardiac clearance and letter will be provided.  We discussed progesterone only as long as not contraindicated with her current medications to try to limit blood loss but may not help with other perimenopausal symptoms.  We also discussed more definitive management for her abnormal bleeding and menstrual symptoms and pain including hysterectomy.  Patient understood all counseling all questions answered satisfactorily.  She will follow-up with cardiology and think about her options and will come back when she has decided how she would like to proceed with her symptom management.  [FreeTextEntry1] : 4/18/25

## 2025-05-02 NOTE — HISTORY OF PRESENT ILLNESS
[FreeTextEntry1] : 44 yo P-  LMP-  4- Is here for initial visit , dr Isaac patient  , consultation , irregular menses , skipping some months since 4-2024

## 2025-05-02 NOTE — HISTORY OF PRESENT ILLNESS
[FreeTextEntry1] : 46 yo P-  LMP-  4- Is here for initial visit , dr Isaac patient  , consultation , irregular menses , skipping some months since 4-2024